# Patient Record
(demographics unavailable — no encounter records)

---

## 2024-11-11 NOTE — ADDENDUM
[FreeTextEntry1] : Please note the patient was reviewed with advanced practice provider I was physically present during the service of the patient I was directly involved in the management plan and recommendations of care provided to the patient.  11/11/2024

## 2024-11-11 NOTE — PROCEDURE
[No] : not [NSR] : normal sinus rhythm [See Device Printout] : See device printout [Pacemaker] : pacemaker [Voltage: ___ volts] : Voltage was [unfilled] volts [Threshold Testing Performed] : Threshold testing was performed [Lead Imp:  ___ohms] : lead impedance was [unfilled] ohms [Sensing Amplitude ___mv] : sensing amplitude was [unfilled] mv [___V @] : [unfilled] V [___ ms] : [unfilled] ms [None] : none [Pace ___ %] : Pace [unfilled]% [de-identified] : Fly  [de-identified] : [id number] [de-identified] : 6/3/24 [de-identified] : ADRIAN [de-identified] :  [de-identified] : > 10 yrs [de-identified] :  No events recorded on Micra.   ILR remains in place and is monitored remotely, therefore unable to monitor Micra remotely.  Reviewed interrogation with MDT rep Ortega who confirmed that ILR is more sensitive for arrhythmia detection therefore was left in place.   Next device interrogation with EP in 6 months.

## 2024-11-11 NOTE — HISTORY OF PRESENT ILLNESS
[FreeTextEntry1] : CLAUDIA FRAGOSO  is a 69 year old  M Last seen in May.  Echocardiogram May 2024 normal left ventricular function aortic root 3.9 cm no significant valvular disease.  Carotid Doppler moderate atherosclerosis.  Borderline aortic dimensions.  Normal left ventricular function.  Carotid atherosclerosis.  Monitor carotid disease.  Monitor aortic dimensions.  Nuclear stress test small fixed defect.  No ischemia normal left ventricular function.  No significant risk current ischemic heart disease.  An outside sleep study has mild sleep apnea prior device check with paroxysmal atrial fibrillation heart block discussed with the EP and recommendation for pacemaker May 2024 potassium 4.6 creatinine 1.4 A1c 7.2 underwent Micra leadless pacemaker June 2024 a postprocedure echocardiogram describes normal left ventricular function with no pericardial effusion follow-up device interrogation follow-up with endocrinology regarding glucose control last office device check was June 2024 follows with hematology regarding phlebotomy there is a history of erythrocytosis.  History of DVT.  Negative hypercoagulable workup.  Remains on anticoagulation.  Also compression stockings remote loop recorder check demonstrates low burden of atrial fibrillation and PVCs blood work August 2024 has a hemoglobin of 15.0 creatinine 1.5 potassium 4.8 creatinine 1.5 September 2024 potassium 4.3 creatinine 1.5 LDL 77 total cholesterol 144 A1c 6.7  Follow-up device check.  Note implantable loop recorder and permanent pacemaker.  Device will be reviewed reviewed for atrial fibrillation.  If documented atrial fibrillation will require adjustment of his anticoagulation dose.  It is notable that he is currently on anticoagulation for his history of DVT.  However this dosing is subtherapeutic for his atrial fibrillation.  Follow-up carotid Doppler.  Follow-up echocardiogram.  Follow-up device check.  Monitor for atrial fibrillation.  There is easy bruising.  He reports his energy initially felt better after pacemaker but subsequently he still does have symptoms of fatigue.  Losartan has been refilled.  CAD s/p coronary bypass surgery on July 16, 2020 for three-vessel disease history of AV block.  s/p ppm, also implantable loop recorder.  atrial fibrillation history of renal transplant on 9/17/2020 on maintenance immunosuppression with Prograf, CellCept and prednisone other history including insulin requiring type 2 diabetes, sleeve gastrectomy in 2015, BENIGNO, hypertension, dyslipidemia, dvt Previously had cardiovascular care at the [facility].   He is maintained on aspirin, anticoagulation, statin and angiotensin receptor blocker.   He is active around his house.   There is chronic dyspnea and fatigue.   His blood pressure remains low.   There is no chest discomfort  He is not having palpitations, orthopnea, or syncope  He does live full-time in [place].  Retired .  Non-smoker.   7/16/2020vfor three-vessel disease with LIMA to LAD SVG to OM and PDA  Bypass surgery was complicated by post op atrial fibrillation and CKD.   AV block with pauses  personally discussed with EP.  I believe he would benefit from pacemaker due to bradycardia in the office with AV block  prior history of advanced AV block and pauses that this may be contributing to his symptoms of fatigue and dyspnea    CAD -s/p coronary bypass surgery done at [facility] on July 16, 2021 with a LIMA to LAD SVG to OM and PDA.  av block, ilr ppm Hypertension Dyslipidemia  Diabetes benigno mild paf S/p renal transplant  h/o DVT  Continue statin as tolerated. Continue antiplatelet therapy.   Continue anticoagulation. Continue angiotensin receptor blocker.    Monitor renal function.   Adjunctive dietary measures.  Glucose control.  Low-sodium diet.

## 2024-11-11 NOTE — HISTORY OF PRESENT ILLNESS
[FreeTextEntry1] : CLAUDIA FRAGOSO  is a 69 year old  M Last seen in May.  Echocardiogram May 2024 normal left ventricular function aortic root 3.9 cm no significant valvular disease.  Carotid Doppler moderate atherosclerosis.  Borderline aortic dimensions.  Normal left ventricular function.  Carotid atherosclerosis.  Monitor carotid disease.  Monitor aortic dimensions.  Nuclear stress test small fixed defect.  No ischemia normal left ventricular function.  No significant risk current ischemic heart disease.  An outside sleep study has mild sleep apnea prior device check with paroxysmal atrial fibrillation heart block discussed with the EP and recommendation for pacemaker May 2024 potassium 4.6 creatinine 1.4 A1c 7.2 underwent Micra leadless pacemaker June 2024 a postprocedure echocardiogram describes normal left ventricular function with no pericardial effusion follow-up device interrogation follow-up with endocrinology regarding glucose control last office device check was June 2024 follows with hematology regarding phlebotomy there is a history of erythrocytosis.  History of DVT.  Negative hypercoagulable workup.  Remains on anticoagulation.  Also compression stockings remote loop recorder check demonstrates low burden of atrial fibrillation and PVCs blood work August 2024 has a hemoglobin of 15.0 creatinine 1.5 potassium 4.8 creatinine 1.5 September 2024 potassium 4.3 creatinine 1.5 LDL 77 total cholesterol 144 A1c 6.7  Follow-up device check.  Note implantable loop recorder and permanent pacemaker.  Device will be reviewed reviewed for atrial fibrillation.  If documented atrial fibrillation will require adjustment of his anticoagulation dose.  It is notable that he is currently on anticoagulation for his history of DVT.  However this dosing is subtherapeutic for his atrial fibrillation.  Follow-up carotid Doppler.  Follow-up echocardiogram.  Follow-up device check.  Monitor for atrial fibrillation.  There is easy bruising.  He reports his energy initially felt better after pacemaker but subsequently he still does have symptoms of fatigue.  Losartan has been refilled.  CAD s/p coronary bypass surgery on July 16, 2020 for three-vessel disease history of AV block.  s/p ppm, also implantable loop recorder.  atrial fibrillation history of renal transplant on 9/17/2020 on maintenance immunosuppression with Prograf, CellCept and prednisone other history including insulin requiring type 2 diabetes, sleeve gastrectomy in 2015, BENIGNO, hypertension, dyslipidemia, dvt Previously had cardiovascular care at the [facility].   He is maintained on aspirin, anticoagulation, statin and angiotensin receptor blocker.   He is active around his house.   There is chronic dyspnea and fatigue.   His blood pressure remains low.   There is no chest discomfort  He is not having palpitations, orthopnea, or syncope  He does live full-time in [place].  Retired .  Non-smoker.   7/16/2020vfor three-vessel disease with LIMA to LAD SVG to OM and PDA  Bypass surgery was complicated by post op atrial fibrillation and CKD.   AV block with pauses  personally discussed with EP.  I believe he would benefit from pacemaker due to bradycardia in the office with AV block  prior history of advanced AV block and pauses that this may be contributing to his symptoms of fatigue and dyspnea    CAD -s/p coronary bypass surgery done at [facility] on July 16, 2021 with a LIMA to LAD SVG to OM and PDA.  av block, ilr ppm Hypertension Dyslipidemia  Diabetes benigno mild paf S/p renal transplant  h/o DVT  Continue statin as tolerated. Continue antiplatelet therapy.   Continue anticoagulation. Continue angiotensin receptor blocker.    Monitor renal function.   Adjunctive dietary measures.  Glucose control.  Low-sodium diet.

## 2024-11-11 NOTE — REVIEW OF SYSTEMS
[Feeling Fatigued] : feeling fatigued [Dyspnea on exertion] : dyspnea during exertion [Joint Pain] : joint pain [Negative] : Heme/Lymph [Easy Bruising] : a tendency for easy bruising

## 2024-11-19 NOTE — HISTORY OF PRESENT ILLNESS
[de-identified] : Referred by: Hospital Follow up \par  \par  Mr. Reeder presented at age 66 in November 2021 for evaluation of bilateral DVT, here for hospital follow up. \par  The patient has a medical history of DM, s/p gastric sleeve, renal transplant July 2020 ([facility]), prior CABG 7/2021 on ASA, c/b DVT/leg clot, known heparin allergy. \par  \par  Presenting HPI: The patient [place] [facility] [place] for LLE cellulitis and initiated on antibiotics for cellulitis, which was c/b bilateral DVT. Doppler US on 11/6/21 showed a non-occlusive DVT in the R common femoral, femoral, popliteal and gastrocnemius veins, as well as DVT involving the left popliteal vein.  X-ray of the left tib/fib showed no evidence of osteomyelitis. He was initiated on Eliquis 10 mg twice daily for 7 days followed by 5 mg twice daily for 3 months minimum.  Laboratory evaluation revealed leukocytosis to 18.41 (89% neutrophils), 6 CRP 12 (elevated), blood cultures no growth to date, urine culture negative.  Leukocytosis improved to 7.8 at the time of admission.  \par  \par  Since discharge he reports low energy levels.  He denies any active bleeding.  He was previously being followed by Dr. Long at [facility] from hematology and had been previously treated with IV iron.  He is also pending phlebotomy today at 1:30 PM for elevated hemoglobin.  JAK2 testing was sent but results are unknown.\par  He remains on Eliquis 5 mg twice daily at this time.  He reports feeling like he is having difficulty healing as well as increased stool frequency.  He also has had occasional episodes of abdominal pain over the past few days but currently denies any active abdominal pain.  He does still have his gallbladder but reports the pain is more midline.\par  Outpatient labs on 10/12/21 were reviewed and discussed with the patient: iron 23 (low), TIBC 273, Tsat 8%, ferritin 21, vitamin B12 855, folate >15\par  \par  HCM: \par  - COVID vaccination: s/p 4 shots, with no antibodies \par  - Colonoscopy: DNA stool test June 2020, negative \par  \par  SH: \par  - Living situation: Lives in [place] \par  - Smoking/etoh/illicits: never smoker, rare etoh \par  \par  FH: \par  - Father with pancreatic cancer age 86 [de-identified] : Cameron presents for follow up on 11/21/24 for DVT and erythrocytosis.  Doppler US on 4/6/22 which showed chronic bilateral lower extremity DVTs without central clot propagation.  Hypercoagulable workup negative.  At today's visit he reports having intermittent fatigue. Remains on eliquis 2.5mg BID. S/p 2 iron infusions in January 2024 which did not help his fatigue.  He saw Dr. Razo today to establish care for GI. Reports chronic abdominal pains. Last colonoscopy 3 years ago.  Having a sleep apnea test next month. He is s/p right hip replacement on 10/6/23- has mostly recovered from the surgery. Does report some right lower extremity swelling and numbness. Reports occasional blood in his stool - has internal hemorrhoids. He denies chills, chest pain, shortness of breath, nausea, vomiting, diarrhea. Using a loop recorder to monitor his heart rhythm.   RUQ US 12/27/23- Multiple small gallbladder stones without ultrasound evidence for acute cholecystitis.  Laboratory studies reviewed at today's visit and notable for: ***

## 2024-12-18 NOTE — HISTORY OF PRESENT ILLNESS
[FreeTextEntry1] : 68-year-old man with history of type 2 diabetes, renal failure status post kidney transplant in 2020 (on prednisone, cellcept and tacrolimus), CAD s/p CABG, A-Fib and AV block (on Eliquis and pacemaker), obesity status post sleeve gasterectomy 2015, hyperlipidemia and recent right hip replacement presents for follow-up.   Plans to travel outside [place] for 2-3 months.   Pt had LLE wound- recently recovered.   Labs  Sep 2024- A1C 6.7%, eGFR 50, Vit B12 of 1299, Tg 122, LDL 77 May 2024- A1C 7.2%, eGFR 55, ACR 9.1 Jan 2024- eGFR 52 (previously ordered labs were not completed)  Currently takes Tresiba 10 U in the morning and Humalog 3-10 U before breakfast (forgets to take it before lunch or dinner), metformin 500 mg BID. Stopped Trulicity 0.75 mg once a week due to cost. No SE on Trulicity. Doesn't want to take Januvia due to concern for potential side effects.    States he has difficulty carrying around and remembering to take Humalog but he couldn't afford Cequr.   CGM downloaded and reviewed: Oscar 2 - doesn't scan oscar frequently; can't upgrade to Oscar 3 for another year due to insurance issues.  Average glucose: 133 % time CGM active:58% % VERY HIGH (>250): 8% % HIGH (181-250): 25% % TARGET (): 67% % LOW (54-69): 0 % VERY LOW (<54): 0   Pt is not scanning oscar frequently. Available data shows a pattern of morning hypoglycemia in 60's and post prandial hyperglycemia. Morning hypoglycemia are asymptomatic. Doesn't confirm it with glucometer. Denies sleeping on the arm the sensor is attached.

## 2024-12-18 NOTE — ASSESSMENT
[FreeTextEntry1] : 68-year-old man with history of type 2 diabetes, renal failure status post kidney transplant, obesity status post sleeve gastric bypass, hyperlipidemia and recent right hip replacement presents for follow-up.   1. Type 2 diabetes-poorly controlled 2. Steroid-induced hyperglycemia (on chronic prednisone 5 mg daily)  Current home regimen- Tresiba 10 units daily, Humalog 3-10 U before breakfast (forgets to take it before lunch or dinner), metformin 500 mg BID. Stopped Trulicity 0.75 mg once a week due price.   Plan: - Emphasized the importance of following recommended regimen. Usually forgets pre-meal insulins.  - He was asked to scan oscar 2 more frequently until he can be switched to Oscar 3 when it's covered  - c/w current regimen - We discussed using pen carrying cooler bag to carry his insulin around and improve his compliance.     3. Hyperlipidemia - Continue with rosuvastatin milligrams daily

## 2025-01-10 NOTE — ASSESSMENT
[FreeTextEntry1] : 70 yo male with BLE edema after bilateral DVTs in 2021.   Pt counseled on above diagnosis. BLE UNNA boot applied  Will refer to tactile for lymphedema massage pump  Pt counseled to walk daily and elevate legs at night RTC in 1 week to monitor UNNA boot   A total of 30 minutes was spent with patient and coordinating care

## 2025-01-10 NOTE — HISTORY OF PRESENT ILLNESS
[FreeTextEntry1] : 12/23/24: 70 yo male PMHx HTN, carotid stenosis, CAD, hx of DVT, HLD, DM II, presents for evaluation of BLE lymphedema. Pt had bilateral DVT in November 2021. On duplex pt had non-occlusive DVT in the R common femoral, femoral, popliteal and gastrocnemius veins, as well as DVT involving the left popliteal vein. He has discharged on Eliquis 5 mg BID and remains on Eliquis.   1/2/25: Pt had BLE UNNA boots on for the past week. Has some mild improvement in edema.

## 2025-01-23 NOTE — PHYSICAL EXAM
[Ankle Swelling (On Exam)] : present [Ankle Swelling Bilaterally] : severe [de-identified] : NAD. well appearing

## 2025-01-23 NOTE — PROCEDURE
[FreeTextEntry1] : 1/23/25 BLE venous duplex:  RT- deep insufficiency CFV, pop. LT- non occlusive post thrombotic in pop with reflux  LT- incompetent perf posterior calf and VV in calf proximal to distal. No DVT

## 2025-01-23 NOTE — HISTORY OF PRESENT ILLNESS
[FreeTextEntry1] : 12/23/24: 70 yo male PMHx HTN, carotid stenosis, CAD, hx of DVT, HLD, DM II, presents for evaluation of BLE lymphedema. Pt had bilateral DVT in November 2021. On duplex pt had non-occlusive DVT in the R common femoral, femoral, popliteal and gastrocnemius veins, as well as DVT involving the left popliteal vein. He has discharged on Eliquis 5 mg BID and remains on Eliquis.   1/2/25: Pt had BLE UNNA boots on for the past week. Has some mild improvement in edema.   1/23/25: Pt has BLE edema. He is compliant with Eliquis 5 mg BID.

## 2025-01-23 NOTE — ASSESSMENT
[FreeTextEntry1] : 70 yo male with BLE edema after bilateral DVTs in 2021.   Pt counseled on above diagnosis. BLE UNNA boot applied  Pt has been referred to tactile for lymphedema massage pump  Pt counseled to walk daily and elevate legs at night RTC in 1 week to monitor UNNA boot   A total of 30 minutes was spent with patient and coordinating care

## 2025-01-24 NOTE — RESULTS/DATA
[FreeTextEntry1] : Mr. Reeder initially presented at age 66 in November 2021 for evaluation of bilateral DVT, here for hospital follow up.  The patient has a medical history of DM, s/p gastric sleeve, renal transplant July 2020 ([facility]), prior CABG on ASA, c/b DVT/leg clot, known heparin allergy.   Continue eliquis 2.5mg BID for b/l DVT. Doppler US with chronic thrombosis, no central clot propagation.  Repeat US 4/2023- no new DVT, +RLE seroma  No indication for phlebotomy at this time.  Hypercoagulable workup negative.  Fatigue- workup negative, stable overall No evidence of anemia on recent labs This is likely multifactorial 2/2 poor sleep, myelosuppressive medications. Sleep work up ongoing. Following w/ Dr. Razo for intermittent longstanding epigastric discomfort RUQ US revealed gallstones, no evidence of cholecystitis.  Now proceeding w/ phlebotomy (500 ml) q 4 weeks as per [place] renal transplant team for hgb >17 and/or hct >50. - last phlebotomy performed 7/18/24 (will call as needed for scripts to repeat labs) Next routine visit w/ Dr. Bran in 5/2025, sooner prn  All patient questions answered at today's visit. Patient urged to call the office with any questions or concerns.   I personally have spent a total of 35 minutes of time on the date of this encounter reviewing test results, documenting findings, coordinating care and directly consulting with the patient and/or designated family member. Greater than 50% of the face to face encounter time was spent on counseling and/or coordination of care for recurrent DVT, fatigue, abdominal pain.

## 2025-01-24 NOTE — HISTORY OF PRESENT ILLNESS
[de-identified] : Referred by: Hospital Follow up \par  \par  Mr. Reeder presented at age 66 in November 2021 for evaluation of bilateral DVT, here for hospital follow up. \par  The patient has a medical history of DM, s/p gastric sleeve, renal transplant July 2020 ([facility]), prior CABG 7/2021 on ASA, c/b DVT/leg clot, known heparin allergy. \par  \par  Presenting HPI: The patient was recently admitted to [facility] [place] 11/5[place]21 to 11/10/21 for LLE cellulitis and initiated on antibiotics for cellulitis, which was c/b bilateral DVT. Doppler US on 11/6/21 showed a non-occlusive DVT in the R common femoral, femoral, popliteal and gastrocnemius veins, as well as DVT involving the left popliteal vein.  X-ray of the left tib/fib showed no evidence of osteomyelitis. He was initiated on Eliquis 10 mg twice daily for 7 days followed by 5 mg twice daily for 3 months minimum.  Laboratory evaluation revealed leukocytosis to 18.41 (89% neutrophils), 6 CRP 12 (elevated), blood cultures no growth to date, urine culture negative.  Leukocytosis improved to 7.8 at the time of admission.  \par  \par  Since discharge he reports low energy levels.  He denies any active bleeding.  He was previously being followed by Dr. Long at [place] from hematology and had been previously treated with IV iron.  He is also pending phlebotomy today at 1:30 PM for elevated hemoglobin.  JAK2 testing was sent but results are unknown.\par  He remains on Eliquis 5 mg twice daily at this time.  He reports feeling like he is having difficulty healing as well as increased stool frequency.  He also has had occasional episodes of abdominal pain over the past few days but currently denies any active abdominal pain.  He does still have his gallbladder but reports the pain is more midline.\par  Outpatient labs on 10/12/21 were reviewed and discussed with the patient: iron 23 (low), TIBC 273, Tsat 8%, ferritin 21, vitamin B12 855, folate >15\par  \par  HCM: \par  - COVID vaccination: s/p 4 shots, with no antibodies \par  - Colonoscopy: DNA stool test June 2020, negative \par  \par  SH: \par  - Living situation: Lives in [place] \par  - Smoking/etoh/illicits: never smoker, rare etoh \par  \par  FH: \par  - Father with pancreatic cancer age 86 [de-identified] : Cameron presents for follow up on 1/23/25 for DVT and erythrocytosis.  Doppler US on 4/6/22 which showed chronic bilateral lower extremity DVTs without central clot propagation.  Hypercoagulable workup negative.  At today's visit Cameron reports feeling generally well.  He continues eliquis 2.5mg BID without bleeding or bruising issues. S/p 2 iron infusions in January 2024 with good tolerance.   Admitted to [facility] for L LE cellulitis 2 months ago (continues to follow w/ Dr. Killian for lymphedema).  He saw Dr. Razo to establish care; chronic abdominal pain not exacerbated.  He is s/p right hip replacement on 10/6/23- has mostly recovered from the surgery.  Last phlebotomy 7/18/24 (as per renal transplant team at [facility]) - his CBC results remain within parameters since that time.  He is leaving next week for [facility] (not planning to return for ~ 3 months). He denies chills, chest pain, shortness of breath, nausea, vomiting, diarrhea. Using a loop recorder to monitor his heart rhythm.   RUQ US 12/27/23- Multiple small gallbladder stones without ultrasound evidence for acute cholecystitis.  Laboratory studies reviewed from 1/9/25 lab draw and notable for:  WBC 8.1, hgb 13.8, hct 44.7, plt 296 Creat 1.41, AST/ALT wnl

## 2025-01-24 NOTE — HISTORY OF PRESENT ILLNESS
[de-identified] : Referred by: Hospital Follow up \par  \par  Mr. Reeder presented at age 66 in November 2021 for evaluation of bilateral DVT, here for hospital follow up. \par  The patient has a medical history of DM, s/p gastric sleeve, renal transplant July 2020 ([facility]), prior CABG 7/2021 on ASA, c/b DVT/leg clot, known heparin allergy. \par  \par  Presenting HPI: The patient was recently admitted to [facility] from 11/5/21 to 11/10/21 for LLE cellulitis and initiated on antibiotics for cellulitis, which was c/b bilateral DVT. Doppler US on 11/6/21 showed a non-occlusive DVT in the R common femoral, femoral, popliteal and gastrocnemius veins, as well as DVT involving the left popliteal vein.  X-ray of the left tib/fib showed no evidence of osteomyelitis. He was initiated on Eliquis 10 mg twice daily for 7 days followed by 5 mg twice daily for 3 months minimum.  Laboratory evaluation revealed leukocytosis to 18.41 (89% neutrophils), 6 CRP 12 (elevated), blood cultures no growth to date, urine culture negative.  Leukocytosis improved to 7.8 at the time of admission.  \par  \par  Since discharge he reports low energy levels.  He denies any active bleeding.  He was previously being followed by Dr. Long at [place] from hematology and had been previously treated with IV iron.  He is also pending phlebotomy today at 1:30 PM for elevated hemoglobin.  JAK2 testing was sent but results are unknown.\par  He remains on Eliquis 5 mg twice daily at this time.  He reports feeling like he is having difficulty healing as well as increased stool frequency.  He also has had occasional episodes of abdominal pain over the past few days but currently denies any active abdominal pain.  He does still have his gallbladder but reports the pain is more midline.\par  Outpatient labs on 10/12/21 were reviewed and discussed with the patient: iron 23 (low), TIBC 273, Tsat 8%, ferritin 21, vitamin B12 855, folate >15\par  \par  HCM: \par  - COVID vaccination: s/p 4 shots, with no antibodies \par  - Colonoscopy: DNA stool test June 2020, negative \par  \par  SH: \par  - Living situation: Lives in [place] \par  - Smoking/etoh/illicits: never smoker, rare etoh \par  \par  FH: \par  - Father with pancreatic cancer age 86 [de-identified] : Cameron presents for follow up on 1/23/25 for DVT and erythrocytosis.  Doppler US on 4/6/22 which showed chronic bilateral lower extremity DVTs without central clot propagation.  Hypercoagulable workup negative.  At today's visit Cameron reports feeling generally well.  He continues eliquis 2.5mg BID without bleeding or bruising issues. S/p 2 iron infusions in January 2024 with good tolerance.   Admitted to [facility] for L LE cellulitis 2 months ago (continues to follow w/ Dr. Killian for lymphedema).  He saw Dr. Razo to establish care; chronic abdominal pain not exacerbated.  He is s/p right hip replacement on 10/6/23- has mostly recovered from the surgery.  Last phlebotomy 7/18/24 (as per renal transplant team at [facility]) - his CBC results remain within parameters since that time.  He is leaving next week for [place] in [place] (not planning to return for ~ 3 months). He denies chills, chest pain, shortness of breath, nausea, vomiting, diarrhea. Using a loop recorder to monitor his heart rhythm.   RUQ US 12/27/23- Multiple small gallbladder stones without ultrasound evidence for acute cholecystitis.  Laboratory studies reviewed from 1/9/25 lab draw and notable for:  WBC 8.1, hgb 13.8, hct 44.7, plt 296 Creat 1.41, AST/ALT wnl

## 2025-01-24 NOTE — RESULTS/DATA
[FreeTextEntry1] : Mr. Reeder initially presented at age 66 in November 2021 for evaluation of bilateral DVT, here for hospital follow up.  The patient has a medical history of DM, s/p gastric sleeve, renal transplant July 2020 ([facility]), prior CABG on ASA, c/b DVT/leg clot, known heparin allergy.   Continue eliquis 2.5mg BID for b/l DVT. Doppler US with chronic thrombosis, no central clot propagation.  Repeat US 4/2023- no new DVT, +RLE seroma  No indication for phlebotomy at this time.  Hypercoagulable workup negative.  Fatigue- workup negative, stable overall No evidence of anemia on recent labs This is likely multifactorial 2/2 poor sleep, myelosuppressive medications. Sleep work up ongoing. Following w/ Dr. Razo for intermittent longstanding epigastric discomfort RUQ US revealed gallstones, no evidence of cholecystitis.  Now proceeding w/ phlebotomy (500 ml) q 4 weeks as per [place] renal transplant team for hgb >17 and/or hct >50. - last phlebotomy performed 7/18/24 (will call as needed for scripts to repeat labs) Next routine visit w/ Dr. Bran in 5/2025, sooner prn  All patient questions answered at today's visit. Patient urged to call the office with any questions or concerns.   I personally have spent a total of 35 minutes of time on the date of this encounter reviewing test results, documenting findings, coordinating care and directly consulting with the patient and/or designated family member. Greater than 50% of the face to face encounter time was spent on counseling and/or coordination of care for recurrent DVT, fatigue, abdominal pain.

## 2025-01-24 NOTE — PHYSICAL EXAM
[Normal] : affect appropriate [de-identified] : Generally well appearing male, NAD [de-identified] : No significant LE swelling appreciable (wearing L LE Shavonne boot) [de-identified] :  Expected bruising of upper arms noted

## 2025-01-24 NOTE — PHYSICAL EXAM
[Normal] : affect appropriate [de-identified] : Generally well appearing male, NAD [de-identified] : No significant LE swelling appreciable (wearing L LE Shavonne boot) [de-identified] :  Expected bruising of upper arms noted

## 2025-01-27 NOTE — HISTORY OF PRESENT ILLNESS
[FreeTextEntry1] : CLAUDIA FRAGOSO  is a 69 year old  M He is heading to [place] for several months he has had follow-up with the [facility] his energy has improved since placement of the pacemaker there are no bleeding issues on combined antiplatelet and anticoagulation therapy there has been improvement in his peripheral edema since recent vascular treatment his blood pressure has been labile on repeat exam it was within normal limits there has been prior intolerance to statin and possible Zetia there is chronic peripheral edema which limits calcium channel blocker.  Also would not want to add thiazide with chronic kidney disease and prior transplant.  Blood pressure does remain within normal limits but there is left ventricular hypertrophy on his echo a clinical follow-up with contrast echo will be performed it is notable that this prior echocardiogram was technically limited I discussed possible increasing his losartan.  However would need to obtain clearance from his renal transplant team at [place] monitor for hypertensive heart disease.  Monitor home blood pressure.  Monitor left ventricular function.  Monitor outflow tract gradient.  Monitor aortic dimensions.  Monitor pulmonary pressures.  I reviewed long-term LDL goal and threshold.  Follow-up blood work has been requested.  I do not want to start before his international travel.  He reports he can make improvement lifestyle measures.  We also discussed nonstatin lipid-lowering therapies.  He is familiar with this as his wife takes PCSK9 monoclonal antibody.  Also he does use insulin for long-term treatment of his diabetes.  Clinical follow-up will be scheduled after requested blood work.  Last seen November 2024 at that time noninvasive testing was recommended carotid Doppler moderate disease echocardiogram normal left ventricular function left ventricular hypertrophy mild valvular heart disease mild pulmonary hypertension.  History of DVT.  Follows with hematology.  On anticoagulation.  Labs December 2024 hemoglobin 13.7 creatinine 1.4 device check reviewed PVC burden 2.6% follow-up with endocrinology regarding diabetes management interim establish care with vascular lower extremity edema.  Unna boot.  Lymphedema treatment.  Improvement in edema.  Keep legs elevated.  Blood work January 2025 hemoglobin 13.8 creatinine 1.4 total cholesterol 198  TSH within normal limits last device check sinus rhythm PVC burden 3% no atrial fibrillation last visit with vascular and hematology noted ultrasound demonstrates no DVT but venous insufficiency post thrombotic changes hypercoagulable workup reportedly negative there is plan for indefinite anticoagulation given history of multiple clots and ? af  Current cardiovascular regimen includes losartan rosuvastatin and aspirin.   Follow-up loop recorder check remote.  Follow-up in office microtech check.  Next in office device check will be May 2025  CAD s/p coronary bypass surgery on July 16, 2020 for three-vessel disease history of AV block. s/p ppm, also implantable loop recorder. history of renal transplant on 9/17/2020 on maintenance immunosuppression with Prograf, CellCept and prednisone other history including insulin requiring type 2 diabetes, sleeve gastrectomy in 2015, BENIGNO, hypertension, dyslipidemia, dvt there is a history of erythrocytosis. History of DVT. Negative hypercoagulable workup. Remains on anticoagulation Previously had cardiovascular care at the [facility].  prior device check with heart block  discussed with EP and recommendation for pacemaker underwent Micra leadless pacemaker June 2024   Nuclear stress test small fixed defect. No ischemia normal left ventricular function.  He does live full-time in [place]. [facility]. Non-smoker.  7/16/2020vfor three-vessel disease with LIMA to LAD SVG to OM and PDA Bypass surgery was complicated by post op atrial fibrillation and CKD.  No significant recurrent ischemic heart disease.  Normal left ventricular function.  Carotid atherosclerosis.  Monitor carotid disease.   Follow-up carotid Doppler.  Monitor aortic dimensions.  Follow-up echocardiogram. follow-up device interrogation  If atrial fibrillation will require adjustment of his anticoagulation dose. t is notable that he is currently on anticoagulation for his history of DVT.   CAD -s/p coronary bypass surgery done at [facility] on July 16, 2021 with a LIMA to LAD SVG to OM and PDA. AV block with pauses s/p ilr & ppm Hypertension Dyslipidemia Diabetes benigno mild paf? S/p renal transplant h/o DVT  Continue statin as tolerated. Continue antiplatelet therapy. Continue anticoagulation. Continue angiotensin receptor blocker. Monitor renal function. Adjunctive dietary measures. Glucose control. Low-sodium diet.

## 2025-01-27 NOTE — HISTORY OF PRESENT ILLNESS
[FreeTextEntry1] : CLAUDIA FRAGOSO  is a 69 year old  M He is heading to [place] for several months he has had follow-up with the [facility] his energy has improved since placement of the pacemaker there are no bleeding issues on combined antiplatelet and anticoagulation therapy there has been improvement in his peripheral edema since recent vascular treatment his blood pressure has been labile on repeat exam it was within normal limits there has been prior intolerance to statin and possible Zetia there is chronic peripheral edema which limits calcium channel blocker.  Also would not want to add thiazide with chronic kidney disease and prior transplant.  Blood pressure does remain within normal limits but there is left ventricular hypertrophy on his echo a clinical follow-up with contrast echo will be performed it is notable that this prior echocardiogram was technically limited I discussed possible increasing his losartan.  However would need to obtain clearance from his renal transplant team at [place] monitor for hypertensive heart disease.  Monitor home blood pressure.  Monitor left ventricular function.  Monitor outflow tract gradient.  Monitor aortic dimensions.  Monitor pulmonary pressures.  I reviewed long-term LDL goal and threshold.  Follow-up blood work has been requested.  I do not want to start before his international travel.  He reports he can make improvement lifestyle measures.  We also discussed nonstatin lipid-lowering therapies.  He is familiar with this as his wife takes PCSK9 monoclonal antibody.  Also he does use insulin for long-term treatment of his diabetes.  Clinical follow-up will be scheduled after requested blood work.  Last seen November 2024 at that time noninvasive testing was recommended carotid Doppler moderate disease echocardiogram normal left ventricular function left ventricular hypertrophy mild valvular heart disease mild pulmonary hypertension.  History of DVT.  Follows with hematology.  On anticoagulation.  Labs December 2024 hemoglobin 13.7 creatinine 1.4 device check reviewed PVC burden 2.6% follow-up with endocrinology regarding diabetes management interim establish care with vascular lower extremity edema.  Unna boot.  Lymphedema treatment.  Improvement in edema.  Keep legs elevated.  Blood work January 2025 hemoglobin 13.8 creatinine 1.4 total cholesterol 198  TSH within normal limits last device check sinus rhythm PVC burden 3% no atrial fibrillation last visit with vascular and hematology noted ultrasound demonstrates no DVT but venous insufficiency post thrombotic changes hypercoagulable workup reportedly negative there is plan for indefinite anticoagulation given history of multiple clots and ? af  Current cardiovascular regimen includes losartan rosuvastatin and aspirin.   Follow-up loop recorder check remote.  Follow-up in office microtech check.  Next in office device check will be May 2025  CAD s/p coronary bypass surgery on July 16, 2020 for three-vessel disease history of AV block. s/p ppm, also implantable loop recorder. history of renal transplant on 9/17/2020 on maintenance immunosuppression with Prograf, CellCept and prednisone other history including insulin requiring type 2 diabetes, sleeve gastrectomy in 2015, BENIGNO, hypertension, dyslipidemia, dvt there is a history of erythrocytosis. History of DVT. Negative hypercoagulable workup. Remains on anticoagulation Previously had cardiovascular care at the [facility].  prior device check with heart block  discussed with EP and recommendation for pacemaker underwent Micra leadless pacemaker June 2024   Nuclear stress test small fixed defect. No ischemia normal left ventricular function.  He does live full-time in [place]. [facility]. Non-smoker.  7/16/2020vfor three-vessel disease with LIMA to LAD SVG to OM and PDA Bypass surgery was complicated by post op atrial fibrillation and CKD.  No significant recurrent ischemic heart disease.  Normal left ventricular function.  Carotid atherosclerosis.  Monitor carotid disease.   Follow-up carotid Doppler.  Monitor aortic dimensions.  Follow-up echocardiogram. follow-up device interrogation  If atrial fibrillation will require adjustment of his anticoagulation dose. t is notable that he is currently on anticoagulation for his history of DVT.   CAD -s/p coronary bypass surgery done at [facility] on July 16, 2021 with a LIMA to LAD SVG to OM and PDA. AV block with pauses s/p ilr & ppm Hypertension Dyslipidemia Diabetes benigno mild paf? S/p renal transplant h/o DVT  Continue statin as tolerated. Continue antiplatelet therapy. Continue anticoagulation. Continue angiotensin receptor blocker. Monitor renal function. Adjunctive dietary measures. Glucose control. Low-sodium diet.

## 2025-01-30 NOTE — PHYSICAL EXAM
[Ankle Swelling (On Exam)] : present [Ankle Swelling Bilaterally] : severe [de-identified] : NAD. well appearing

## 2025-01-30 NOTE — HISTORY OF PRESENT ILLNESS
[FreeTextEntry1] : 12/23/24: 70 yo male PMHx HTN, carotid stenosis, CAD, hx of DVT, HLD, DM II, presents for evaluation of BLE lymphedema. Pt had bilateral DVT in November 2021. On duplex pt had non-occlusive DVT in the R common femoral, femoral, popliteal and gastrocnemius veins, as well as DVT involving the left popliteal vein. He has discharged on Eliquis 5 mg BID and remains on Eliquis.   1/2/25: Pt had BLE UNNA boots on for the past week. Has some mild improvement in edema.   1/23/25: Pt has BLE edema. He is compliant with Eliquis 5 mg BID.   1/30/25: Pt has increased LLE edema. He denies any chest pain or SOB.

## 2025-01-30 NOTE — ASSESSMENT
[FreeTextEntry1] : 68 yo male with BLE edema after bilateral DVTs in 2021.   Pt counseled on above diagnosis. GENEE SHAILA toriboi applied  Pt has been referred to tactile for lymphedema massage pump  Pt counseled to walk daily and elevate legs at night RTC in 1 week to monitor UNNA boot   A total of 30 minutes was spent with patient and coordinating care DC instructions Impaired Gait

## 2025-04-24 NOTE — ASSESSMENT
[FreeTextEntry1] : 70 yo male with non-healing LLE wounds.   Pt counseled on above diagnosis. Pt refused UNNA boots. Wound like to do his own wound care  Pt has been referred to tactile for lymphedema massage pump  Pt counseled to walk daily and elevate legs at night RTC PRN  A total of 30 minutes was spent with patient and coordinating care

## 2025-04-24 NOTE — HISTORY OF PRESENT ILLNESS
[FreeTextEntry1] : 12/23/24: 68 yo male PMHx HTN, carotid stenosis, CAD, hx of DVT, HLD, DM II, presents for evaluation of BLE lymphedema. Pt had bilateral DVT in November 2021. On duplex pt had non-occlusive DVT in the R common femoral, femoral, popliteal and gastrocnemius veins, as well as DVT involving the left popliteal vein. He has discharged on Eliquis 5 mg BID and remains on Eliquis.   1/2/25: Pt had BLE UNNA boots on for the past week. Has some mild improvement in edema.   1/23/25: Pt has BLE edema. He is compliant with Eliquis 5 mg BID.   1/30/25: Pt has increased LLE edema. He denies any chest pain or SOB.   4/24/25: Pt has developed several wounds on LLE. He is treating them with alcohol and ointment. He denies any fever or chills.

## 2025-04-24 NOTE — PHYSICAL EXAM
[Ankle Swelling (On Exam)] : present [Ankle Swelling Bilaterally] : severe [de-identified] : NAD. well appearing  [FreeTextEntry1] : Five wounds on LLE. Two wounds on anterior shin. One wounds on Medial calf. Dorsal foot. All wounds about 0.5 cm x 0.5 cm. Wound base with slough.

## 2025-05-01 NOTE — REVIEW OF SYSTEMS
[Feeling Fatigued] : feeling fatigued [Lower Ext Edema] : lower extremity edema [Joint Pain] : joint pain [Easy Bruising] : a tendency for easy bruising [Negative] : Psychiatric

## 2025-05-02 NOTE — HISTORY OF PRESENT ILLNESS
[FreeTextEntry1] : CLAUDIA FRAGOSO  is a 69 year old  M  In the interim there has been visits with vascular for management of his lower extremity edema loop recorder February 2025 no A-fib 3% burden of PVCs March 2025 no atrial fibrillation 3.2% burden of PVCs outside blood work April 2025 A1c 8.4 follows with endocrinology regarding diabetes management hemoglobin 14.0 creatinine 1.4  CRP 0.2 CPK 47  TSH 0.8 LPA within normal limits recent labs reviewed blood pressure has been controlled.  His lower extremity edema is better.  He is also following with wound care.  There are no bleeding issues.  Discussed importance of glucose control.  Reviewed long-term LDL goal.  Favor addition of monoclonal antibody.  He reports he can make improvement lifestyle measures.  If cholesterol remains elevated further adjunctive lipid-lowering therapy.  Follow-up device check.  CAD s/p coronary bypass surgery on July 16, 2020 for three-vessel disease history of AV block. s/p ppm, also implantable loop recorder?? history of renal transplant on 9/17/2020 on maintenance immunosuppression with Prograf, CellCept and prednisone other history including insulin requiring type 2 diabetes, sleeve gastrectomy in 2015, BENIGNO, hypertension, dyslipidemia, dvt there is a history of erythrocytosis. History of DVT. Negative hypercoagulable workup. Remains on indef anticoagulation Previously had cardiovascular care at the [facility].  prior device check with heart block Micra leadless pacemaker June 2024  He does live full-time in [place]. . Non-smoker.  7/16/2020vfor three-vessel disease with LIMA to LAD SVG to OM and PDA Bypass surgery was complicated by post op atrial fibrillation and CKD.  Last seen November 2024  at that time noninvasive testing was recommended  carotid Doppler moderate disease  echocardiogram normal left ventricular function left ventricular hypertrophy mild valvular heart disease mild pulmonary hypertension.  Nuclear stress test small fixed defect. No ischemia normal left ventricular function.  He is heading to [place] for several months  had follow-up with the [facility]  energy has improved since placement of the pacemaker  there are no bleeding issues on combined antiplatelet and anticoagulation therapy vascular for lower extremity edema.  blood pressure has been labile  on repeat exam it was within normal limits  prior intolerance to statin and possible Zetia   follow-up with endocrinology regarding diabetes management  Improvement in edema. f/u vasc Keep legs elevated.  chronic peripheral edema w limits calcium channel blocker at clinical follow-up contrast echo will be performed it is notable that this prior echocardiogram was technically limited  Monitor home blood pressure.  Monitor left ventricular function, outflow tract gradient, aortic dimensions pulmonary pressures.  Reviewed long-term LDL goal and threshold.  Discussed nonstatin lipid-lowering therapies. He is familiar with this as his wife takes PCSK9 monoclonal antibody.  Also he does use insulin for long-term treatment of his diabetes.  Clinical follow-up will be scheduled after requested blood work.  Monitor carotid disease. follow-up device interrogation If atrial fibrillation will require adjustment of his anticoagulation dose. t is notable that he is currently on anticoagulation for his history of DVT.  CAD -s/p coronary bypass surgery done at [facility] on July 16, 2021 with a LIMA to LAD SVG to OM and PDA. AV block with pauses s/p ilr & ppm Hypertension Dyslipidemia Diabetes benigno mild paf? S/p renal transplant h/o DVT  Continue statin as tolerated. Continue antiplatelet therapy. Continue anticoagulation. Continue angiotensin receptor blocker. Monitor renal function. Adjunctive dietary measures. Glucose control. Low-sodium diet.

## 2025-05-02 NOTE — HISTORY OF PRESENT ILLNESS
[FreeTextEntry1] : CLAUDIA FRAGOSO  is a 69 year old  M  In the interim there has been visits with vascular for management of his lower extremity edema loop recorder February 2025 no A-fib 3% burden of PVCs March 2025 no atrial fibrillation 3.2% burden of PVCs outside blood work April 2025 A1c 8.4 follows with endocrinology regarding diabetes management hemoglobin 14.0 creatinine 1.4  CRP 0.2 CPK 47  TSH 0.8 LPA within normal limits recent labs reviewed blood pressure has been controlled.  His lower extremity edema is better.  He is also following with wound care.  There are no bleeding issues.  Discussed importance of glucose control.  Reviewed long-term LDL goal.  Favor addition of monoclonal antibody.  He reports he can make improvement lifestyle measures.  If cholesterol remains elevated further adjunctive lipid-lowering therapy.  Follow-up device check.  CAD s/p coronary bypass surgery on July 16, 2020 for three-vessel disease history of AV block. s/p ppm, also implantable loop recorder?? history of renal transplant on 9/17/2020 on maintenance immunosuppression with Prograf, CellCept and prednisone other history including insulin requiring type 2 diabetes, sleeve gastrectomy in 2015, BENIGNO, hypertension, dyslipidemia, dvt there is a history of erythrocytosis. History of DVT. Negative hypercoagulable workup. Remains on indef anticoagulation Previously had cardiovascular care at the [facility].  prior device check with heart block Micra leadless pacemaker June 2024  He does live full-time in [place]. . Non-smoker.  7/16/2020vfor three-vessel disease with LIMA to LAD SVG to OM and PDA Bypass surgery was complicated by post op atrial fibrillation and CKD.  Last seen November 2024  at that time noninvasive testing was recommended  carotid Doppler moderate disease  echocardiogram normal left ventricular function left ventricular hypertrophy mild valvular heart disease mild pulmonary hypertension.  Nuclear stress test small fixed defect. No ischemia normal left ventricular function.  He is heading to [place] for several months  had follow-up with the [facility]  energy has improved since placement of the pacemaker  there are no bleeding issues on combined antiplatelet and anticoagulation therapy vascular for lower extremity edema.  blood pressure has been labile  on repeat exam it was within normal limits  prior intolerance to statin and possible Zetia   follow-up with endocrinology regarding diabetes management  Improvement in edema. f/u vasc Keep legs elevated.  chronic peripheral edema w limits calcium channel blocker at clinical follow-up contrast echo will be performed it is notable that this prior echocardiogram was technically limited  Monitor home blood pressure.  Monitor left ventricular function, outflow tract gradient, aortic dimensions pulmonary pressures.  Reviewed long-term LDL goal and threshold.  Discussed nonstatin lipid-lowering therapies. He is familiar with this as his wife takes PCSK9 monoclonal antibody.  Also he does use insulin for long-term treatment of his diabetes.  Clinical follow-up will be scheduled after requested blood work.  Monitor carotid disease. follow-up device interrogation If atrial fibrillation will require adjustment of his anticoagulation dose. t is notable that he is currently on anticoagulation for his history of DVT.  CAD -s/p coronary bypass surgery done at [facility] on July 16, 2021 with a LIMA to LAD SVG to OM and PDA. AV block with pauses s/p ilr & ppm Hypertension Dyslipidemia Diabetes benigno mild paf? S/p renal transplant h/o DVT  Continue statin as tolerated. Continue antiplatelet therapy. Continue anticoagulation. Continue angiotensin receptor blocker. Monitor renal function. Adjunctive dietary measures. Glucose control. Low-sodium diet.

## 2025-05-12 NOTE — PHYSICAL EXAM
[Normal] : affect appropriate [de-identified] : Generally well appearing male, NAD [de-identified] : No significant LE swelling appreciable (wearing L LE Shavonne boot) [de-identified] :  Expected bruising of upper arms noted

## 2025-05-12 NOTE — RESULTS/DATA
[FreeTextEntry1] : Mr. Reeder initially presented at age 66 in November 2021 for evaluation of bilateral DVT, here for hospital follow up.  The patient has a medical history of DM, s/p gastric sleeve, renal transplant July 2020 ([facility]), prior CABG on ASA, c/b DVT/leg clot, known heparin allergy.   Continue eliquis 2.5mg BID for b/l DVT. Doppler US with chronic thrombosis, no central clot propagation.  Repeat US 4/2023- no new DVT, +RLE seroma  No indication for phlebotomy at this time.  Hypercoagulable workup negative.  Fatigue- workup negative, stable overall No evidence of anemia recently (will repeat anemia labs 7/2025) This is likely multifactorial 2/2 poor sleep, myelosuppressive medications.  Saw w/ Dr. Razo for intermittent longstanding epigastric discomfort RUQ US revealed gallstones, no evidence of cholecystitis.  Now proceeding w/ phlebotomy (500 ml) q 4 weeks prn as per [place] renal transplant team for hgb >17 and/or hct >50. - last phlebotomy performed 7/18/24 (now on labs q3 months, given paper scripts for 7/2025) Next routine visit 8/2025 w/ WALLACE Maurer NP sooner prn (will be seen at [place] in 9/2025, then subsequent visit w/ Dr. Bran in 12/2025)  All patient questions answered at today's visit. Patient urged to call the office with any questions or concerns.   I personally have spent a total of 35 minutes of time on the date of this encounter reviewing test results, documenting findings, coordinating care and directly consulting with the patient and/or designated family member. Greater than 50% of the face to face encounter time was spent on counseling and/or coordination of care for recurrent DVT, fatigue, abdominal pain.

## 2025-05-12 NOTE — RESULTS/DATA
[FreeTextEntry1] : Mr. Reeder initially presented at age 66 in November 2021 for evaluation of bilateral DVT, here for hospital follow up.  The patient has a medical history of DM, s/p gastric sleeve, renal transplant July 2020 ([facility]), prior CABG on ASA, c/b DVT/leg clot, known heparin allergy.   Continue eliquis 2.5mg BID for b/l DVT. Doppler US with chronic thrombosis, no central clot propagation.  Repeat US 4/2023- no new DVT, +RLE seroma  No indication for phlebotomy at this time.  Hypercoagulable workup negative.  Fatigue- workup negative, stable overall No evidence of anemia recently (will repeat anemia labs 7/2025) This is likely multifactorial 2/2 poor sleep, myelosuppressive medications.  Saw w/ Dr. Razo for intermittent longstanding epigastric discomfort RUQ US revealed gallstones, no evidence of cholecystitis.  Now proceeding w/ phlebotomy (500 ml) q 4 weeks prn as per [place] renal transplant team for hgb >17 and/or hct >50. - last phlebotomy performed 7/18/24 (now on labs q3 months, given paper scripts for 7/2025) Next routine visit 8/2025 w/ WALLACE Maurer NP sooner prn (will be seen at [place] in 9/2025, then subsequent visit w/ Dr. Bran in 12/2025)  All patient questions answered at today's visit. Patient urged to call the office with any questions or concerns.   I personally have spent a total of 35 minutes of time on the date of this encounter reviewing test results, documenting findings, coordinating care and directly consulting with the patient and/or designated family member. Greater than 50% of the face to face encounter time was spent on counseling and/or coordination of care for recurrent DVT, fatigue, abdominal pain.

## 2025-05-12 NOTE — HISTORY OF PRESENT ILLNESS
[de-identified] : Referred by: Hospital Follow up   Mr. Reeder presented at age 66 in November 2021 for evaluation of bilateral DVT, here for hospital follow up.  The patient has a medical history of DM, s/p gastric sleeve, renal transplant July 2020 ([facility]), prior CABG 7/2021 on ASA, c/b DVT/leg clot, known heparin allergy.   Presenting HPI: The patient was recently admitted to [facility] from [place]5/21 to 11/10/21 for LLE cellulitis and initiated on antibiotics for cellulitis, which was c/b bilateral DVT. Doppler US on 11/6/21 showed a non-occlusive DVT in the R common femoral, femoral, popliteal and gastrocnemius veins, as well as DVT involving the left popliteal vein.  X-ray of the left tib/fib showed no evidence of osteomyelitis. He was initiated on Eliquis 10 mg twice daily for 7 days followed by 5 mg twice daily for 3 months minimum.  Laboratory evaluation revealed leukocytosis to 18.41 (89% neutrophils), 6 CRP 12 (elevated), blood cultures no growth to date, urine culture negative.  Leukocytosis improved to 7.8 at the time of admission.    Since discharge he reports low energy levels.  He denies any active bleeding.  He was previously being followed by Dr. Long at [place] from hematology and had been previously treated with IV iron.  He is also pending phlebotomy today at 1:30 PM for elevated hemoglobin.  JAK2 testing was sent but results are unknown. He remained on Eliquis 5 mg twice daily.  He reported feeling like he is having difficulty healing as well as increased stool frequency.  He also has had occasional episodes of abdominal pain over the past few days but currently denies any active abdominal pain.  He does still have his gallbladder but reported the pain is more midline. Outpatient labs on 10/12/21 were reviewed and discussed with the patient: iron 23 (low), TIBC 273, Tsat 8%, ferritin 21, vitamin B12 855, folate >15  HCM:  - COVID vaccination: s/p 4 shots, with no antibodies  - Colonoscopy: DNA stool test June 2020, negative   SH:  - Living situation: Lives in [place]  - Smoking/etoh/illicits: never smoker, rare etoh   FH:  - Father with pancreatic cancer age 86 [de-identified] : Cameron presents for follow up on 5/12/25 for DVT and erythrocytosis.  Doppler US on 4/6/22 which showed chronic bilateral lower extremity DVTs without central clot propagation.  Hypercoagulable workup negative.  At today's visit Cameron reports feeling generally well except for persistent fatigue.  He was seen recently at [facility] for ongoing bilateral LE lymphedema and wound care as well as skin tearing (felt to be a side effect of immunotherapy after renal transplant) - he returns to [place] annually each September for visit w/ the transplant team.  He continues eliquis 2.5mg BID. S/p 2 iron infusions in January 2024 with good tolerance.   He continues abtx for L LE cellulitis.  He previously saw Dr. Razo to establish care; chronic abdominal pain not exacerbated.  He is s/p right hip replacement on 10/6/23- has mostly recovered from the surgery.  Last phlebotomy 7/18/24 (as per renal transplant team at [facility]) - his CBC results remain within parameters since that time.   He denies recent fever/chills, chest pain, shortness of breath, nausea, vomiting, diarrhea. Using a loop recorder to monitor his heart rhythm.   RUQ US 12/27/23- Multiple small gallbladder stones without ultrasound evidence for acute cholecystitis.  Laboratory studies reviewed from 4/16/25 lab draw and notable for:  WBC 7.77, hgb 14.0, hct 445.9, plt 398 Creat 1.35, AST/ALT wnl Pt prefers to have labs draw 3 months at an outside lab near his home in [place]

## 2025-05-12 NOTE — HISTORY OF PRESENT ILLNESS
[de-identified] : Referred by: Hospital Follow up   Mr. Reeder presented at age 66 in November 2021 for evaluation of bilateral DVT, here for hospital follow up.  The patient has a medical history of DM, s/p gastric sleeve, renal transplant July 2020 ([facility]), prior CABG 7/2021 on ASA, c/b DVT/leg clot, known heparin allergy.   Presenting HPI: The patient was recently admitted to [facility] from 11/5/21 to 11/10/21 for LLE cellulitis and initiated on antibiotics for cellulitis, which was c/b bilateral DVT. Doppler US on 11/6/21 showed a non-occlusive DVT in the R common femoral, femoral, popliteal and gastrocnemius veins, as well as DVT involving the left popliteal vein.  X-ray of the left tib/fib showed no evidence of osteomyelitis. He was initiated on Eliquis 10 mg twice daily for 7 days followed by 5 mg twice daily for 3 months minimum.  Laboratory evaluation revealed leukocytosis to 18.41 (89% neutrophils), 6 CRP 12 (elevated), blood cultures no growth to date, urine culture negative.  Leukocytosis improved to 7.8 at the time of admission.    Since discharge he reports low energy levels.  He denies any active bleeding.  He was previously being followed by Dr. Long at [place] from hematology and had been previously treated with IV iron.  He is also pending phlebotomy today at 1:30 PM for elevated hemoglobin.  JAK2 testing was sent but results are unknown. He remained on Eliquis 5 mg twice daily.  He reported feeling like he is having difficulty healing as well as increased stool frequency.  He also has had occasional episodes of abdominal pain over the past few days but currently denies any active abdominal pain.  He does still have his gallbladder but reported the pain is more midline. Outpatient labs on 10/12/21 were reviewed and discussed with the patient: iron 23 (low), TIBC 273, Tsat 8%, ferritin 21, vitamin B12 855, folate >15  HCM:  - COVID vaccination: s/p 4 shots, with no antibodies  - Colonoscopy: DNA stool test June 2020, negative   SH:  - Living situation: Lives in [place]  - Smoking/etoh/illicits: never smoker, rare etoh   FH:  - Father with pancreatic cancer age 86 [de-identified] : Cameron presents for follow up on 5/12/25 for DVT and erythrocytosis.  Doppler US on 4/6/22 which showed chronic bilateral lower extremity DVTs without central clot propagation.  Hypercoagulable workup negative.  At today's visit Cameron reports feeling generally well except for persistent fatigue.  He was seen recently at [facility] for ongoing bilateral LE lymphedema and wound care as well as skin tearing (felt to be a side effect of immunotherapy after renal transplant) - he returns to [place] annually each September for visit w/ the transplant team.  He continues eliquis 2.5mg BID. S/p 2 iron infusions in January 2024 with good tolerance.   He continues abtx for L LE cellulitis.  He previously saw Dr. Razo to establish care; chronic abdominal pain not exacerbated.  He is s/p right hip replacement on 10/6/23- has mostly recovered from the surgery.  Last phlebotomy 7/18/24 (as per renal transplant team at [facility]) - his CBC results remain within parameters since that time.   He denies recent fever/chills, chest pain, shortness of breath, nausea, vomiting, diarrhea. Using a loop recorder to monitor his heart rhythm.   RUQ US 12/27/23- Multiple small gallbladder stones without ultrasound evidence for acute cholecystitis.  Laboratory studies reviewed from 4/16/25 lab draw and notable for:  WBC 7.77, hgb 14.0, hct 445.9, plt 398 Creat 1.35, AST/ALT wnl Pt prefers to have labs draw 3 months at an outside lab near his home in [place]

## 2025-05-12 NOTE — PHYSICAL EXAM
[Normal] : affect appropriate [de-identified] : Generally well appearing male, NAD [de-identified] : No significant LE swelling appreciable (wearing L LE Shavonne boot) [de-identified] :  Expected bruising of upper arms noted Isotretinoin Pregnancy And Lactation Text: This medication is Pregnancy Category X and is considered extremely dangerous during pregnancy. It is unknown if it is excreted in breast milk.

## 2025-05-16 NOTE — REVIEW OF SYSTEMS
[Dyspnea on exertion] : dyspnea during exertion [Fever] : no fever [Feeling Fatigued] : not feeling fatigued [SOB] : no shortness of breath [Chest Discomfort] : no chest discomfort [Palpitations] : no palpitations [Cough] : no cough [Abdominal Pain] : no abdominal pain [Dizziness] : no dizziness [Under Stress] : not under stress [Easy Bleeding] : no tendency for easy bleeding

## 2025-05-16 NOTE — PHYSICAL EXAM
[No Acute Distress] : no acute distress [Normal Venous Pressure] : normal venous pressure [Normal S1, S2] : normal S1, S2 [Clear Lung Fields] : clear lung fields [Non Tender] : non-tender [No Edema] : no edema [No Focal Deficits] : no focal deficits [Alert and Oriented] : alert and oriented [de-identified] : leg ulcer - wrapped left leg

## 2025-05-16 NOTE — HISTORY OF PRESENT ILLNESS
[FreeTextEntry1] : Cameron is a 70-year-old man who is seen in follow-up evaluation regarding his implanted loop monitor as well as his implanted leadless pacemaker Micra AV  The patient just returned from the medical visit to the [facility].  He had some infection and underwent a biopsy and the results are in discussion pending.  Interrogation of his implantable loop shows that he has PVCs less than 2%.  No significant bradycardia or pauses.  No A-fib noted.  Patient his Micra AV pacemaker shows remaining battery longevity greater than 10 years.  The right ventricular lead impedance was 509 ohms within normal range.  Capture threshold 0.5 V at 0.24 ms.  He is ventricular paced only about 21% of the time.  Is programmed VDD rate of 50 at 125. Interrogation of his implantable loop monitor showed that he had episodes of bradycardia with isolated episodes OF AV block 4 seconds and heart rates 45 to 50 bpm.  Episodes seem to occur in the night possible sleeping hours.  Patient has a prior history of coronary artery bypass surgery 2020 triple-vessel with a LIMA to the LAD.  He had postoperative atrial fibrillation and CKD.  The patient had a prior renal transplant in 2020 and is maintained on immunosuppression Prograf CellCept and prednisone.  He has a prior history of type 2 diabetes, hypertension, sleeve gastrectomy and previous BENIGNO.  He is currently not using CPAP because he has had weight loss.  The patient has had issues with easy bruising and wounds including ulcer that he sustained to the left lower extremity that is currently wrapped and he is getting wound care.  He was previously evaluated by his cardiologist and he was noted to have an ectopic atrial rhythm with sinus bradycardia and first-degree AV block and blocked APCs.  The patient has hip right hip pain that severely limits him and requires surgical procedure of hip replacement.  He had an event monitor placed prior to the proposed surgery.  Zio patch monitor performed for 3 days starting 8/17/2023: Showed secondary type I AV block with up to 3-second pause these were all nocturnal.  He also had ventricular couplet, single PVC and 3 beats of NSVT.  On 8/20/2023 at 7:01 AM the patient had High-grade AV block.  He also had similar third-degree AV block on 8/19/2023 at 6:10 AM.   The patient had prior bariatric surgery and is lost weight.  He had a previous sleep study done in 2024 that showed an AHI of 8.5.

## 2025-05-16 NOTE — DISCUSSION/SUMMARY
[FreeTextEntry1] : Overall the impression based on the interrogation of the loop monitor as well as the pacemaker is his pacemaker is functioning normally and pacing appropriately.   No cardiac symptoms Has some fatigue He has been on Eliquis 2.5 mg for previous blood clot in the leg. The patient did have a sleep study done May 2024 [facility] that showed an AHI of 8.5. Continue with remote monitoring.

## 2025-06-18 NOTE — DATA REVIEWED
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 Clarithromycin is incubating for 14 days to test for inducible resistance (erm gene). Other in vitro susceptibilities are available in Epic.   The current results are as follows:  Antibiotic | Concentration | Susceptibility ----------|--------------|------------ Amikacin | 32 mcg/mL | Intermediate Cefoxitin | >128 mcg/mL | Resistant Ciprofloxacin | >4 mcg/mL | Resistant Clofazimine | 0.5 mcg/mL |  Doxycycline | 0.5 mcg/mL | Susceptible Imipenem | 8 mcg/mL | Intermediate Linezolid | 16 mcg/mL | Intermediate Moxifloxacin | >4 mcg/mL | Resistant Tigecycline | 0.25 mcg/mL |  Tobramycin | 4 mcg/mL | Intermediate Trimethoprim + Sulfamethoxazole | 4/76 mcg/mL | Resistant

## 2025-06-18 NOTE — PHYSICAL EXAM
[General Appearance - Alert] : alert [General Appearance - In No Acute Distress] : in no acute distress [Sclera] : the sclera and conjunctiva were normal [PERRL With Normal Accommodation] : pupils were equal in size, round, reactive to light [Extraocular Movements] : extraocular movements were intact [Outer Ear] : the ears and nose were normal in appearance [Oropharynx] : the oropharynx was normal with no thrush [Neck Appearance] : the appearance of the neck was normal [Neck Cervical Mass (___cm)] : no neck mass was observed [Jugular Venous Distention Increased] : there was no jugular-venous distention [] : no respiratory distress [Auscultation Breath Sounds / Voice Sounds] : lungs were clear to auscultation bilaterally [Heart Rate And Rhythm] : heart rate was normal and rhythm regular [Heart Sounds] : normal S1 and S2 [Full Pulse] : the pedal pulses are present [Edema] : there was no peripheral edema [Bowel Sounds] : normal bowel sounds [Abdomen Soft] : soft [Abdomen Tenderness] : non-tender [Costovertebral Angle Tenderness] : no CVA tenderness [Musculoskeletal - Swelling] : no joint swelling [Nail Clubbing] : no clubbing  or cyanosis of the fingernails [Motor Tone] : muscle strength and tone were normal [Skin Color & Pigmentation] : normal skin color and pigmentation [FreeTextEntry1] : There are total of 6 lesions on his left lower leg, pustular.  There are 2 lesions near the mid leg anteriorly/anterior medially.  There is a superior lesion on the left medial calf area.  There are 3 lesions on the dorsum of his left foot.  All appear to be pustular, with no significant drainage, erythematous base.  The first lesion to develop was the one on the mid anterior medial leg.  Then by February 2025, another lesion develop on the lateral edge of his foot. [Cranial Nerves] : cranial nerves 2-12 were intact [Sensation] : the sensory exam was normal to light touch and pinprick [No Focal Deficits] : no focal deficits [Oriented To Time, Place, And Person] : oriented to person, place, and time [Affect] : the affect was normal

## 2025-06-18 NOTE — ASSESSMENT
[FreeTextEntry1] : This 70 y.o. man with ESRD, renal transplant, on chronic immunosuppression.  here for cutaneous mycobacterial infection.   - will start trial of Doxycycline 100mg PO BID and Linezolid 600mg PO BID.   - unclear if this is the best regimen,  will need to monitor closely his mycophenolate levels and tacrolimus levels  - I have relayed a message to his Renal Doctor here, Dr. Villalta.   I will refer him over to my colleague in the NTM center at [facility] Dr. Michael Oppenheim, in case IV therapy is needed.    follow up in 2 weeks or so.

## 2025-06-18 NOTE — ASSESSMENT
[FreeTextEntry1] : This 70 y.o. man with ESRD, renal transplant, on chronic immunosuppression.  here for cutaneous mycobacterial infection.   - will start trial of Doxycycline 100mg PO BID and Linezolid 600mg PO BID.   - unclear if this is the best regimen,  will need to monitor closely his mycophenolate levels and tacrolimus levels  - I have relayed a message to his Renal Doctor here, Dr. Villalta.   I will refer him over to my colleague in the NTM center at [facility], Dr. Michael Oppenheim, in case IV therapy is needed.    follow up in 2 weeks or so.

## 2025-06-20 NOTE — HISTORY OF PRESENT ILLNESS
[FreeTextEntry1] : 70-year-old man with history of type 2 diabetes, renal failure status post kidney transplant in 2020 (on prednisone, cellcept and tacrolimus), CAD s/p CABG, A-Fib and AV block (on Eliquis and pacemaker), obesity status post sleeve gastrectomy 2015, hyperlipidemia and recent right hip replacement presents for follow-up.   Pt had LLE wound- recently recovered. After [place] travel, he developed mycobacterium lesions on Lt leg.   Labs  April 2025- HA1C 8.4% Sep 2024- A1C 6.7%, eGFR 50, Vit B12 of 1299, Tg 122, LDL 77 May 2024- A1C 7.2%, eGFR 55, ACR 9.1 Jan 2024- eGFR 52 (previously ordered labs were not completed)  CGM downloaded and reviewed: Oscar 2 - doesn't scan oscar frequently; can't upgrade to Oscar 3 for another year due to insurance issues.  Average glucose: 168 % time CGM active:74% % VERY HIGH (>250): 9% % HIGH (181-250): 26% % TARGET (): 65% % LOW (54-69): 0 % VERY LOW (<54): 0  Currently takes Tresiba 10 U in the morning and Humalog 3-10 U before breakfast (forgets to take it before lunch or dinner), metformin 500 mg BID. Stopped Trulicity 0.75 mg once a week due to cost. No SE on Trulicity. Doesn't want to take Januvia due to concern for potential side effects.    States he has difficulty carrying around and remembering to take Humalog but he couldn't afford Cequr.    Pt is not scanning oscar frequently. Available data shows a pattern of morning hypoglycemia in 60's and post prandial hyperglycemia. Morning hypoglycemia are asymptomatic. Doesn't confirm it with glucometer. Denies sleeping on the arm the sensor is attached.

## 2025-06-20 NOTE — ASSESSMENT
[FreeTextEntry1] : 1. Sub optimally Type 2 diabetes-poorly controlled 2. Steroid-induced hyperglycemia (on chronic prednisone 5 mg daily) - c/w Tresiba 10 units daily. CGM report mainly shows postprandial hyperglycemia. Reminded him about administering prandial insulin 15 min before all meals. Usually administers 4-9 U Humalog if he administers it. Advised Humalog 4-6 U plus 2 U for every 50 mg/dl glucose above 150 mg/dl. c/w metformin 500 mg BID.   Stopped Trulicity 0.75 mg once a week due price.  - Emphasized the importance of following recommended regimen. Usually forgets pre-meal insulins.  - We discussed using pen carrying cooler bag to carry his insulin around and improve his compliance.     3. Hyperlipidemia - Continue with rosuvastatin milligrams daily  4. Vit D deficiency- takes vit D supplementation- 2000 IU daily. will check vit D level.

## 2025-07-02 NOTE — HISTORY OF PRESENT ILLNESS
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  History abstracted from [facility] Records: He underwent a living unrelated kidney transplant on September 17, 2020, for management of ESRD due to complications from type 2 diabetes mellitus. He is on chronic immunosuppression with Mycophenolate mofetil, tacrolimus and prednisone.  Mr. Reeder developed left leg swelling and pain with skin ulcerations sometime in April 2025. He was following a Vascular Surgeon in [place] who prescribed local wound care and Silvadene with compression wrapping; however, his symptoms and the ulcers did not improve, so he came here to [facility] on May 06, 2025, and was seen by Dermatology, who noted several scattered sores including subcutaneous nodules on his left lower leg. An excisional biopsy was performed on one of the lesions, and dermatopathology revealed AFB and Aravind stains positive with multifocal aggregates of bacilli within areas of abscess, compatible with mycobacterial infection. 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 In-vitro susceptibilities show the isolate is resistant to Cefoxitin, Ciprofloxacin, Moxifloxacin and Trimethoprim-sulfamethoxazole. It is intermediate to Tobramycin, Linezolid, Imipenem and Amikacin. We are waiting for Clarithromycin to result out pending 14-day incubation for inducible resistance (erm gene).  update: Clarithromycin-intermediate susceptibility 4mg/ml.  **The rest of the records are scanned under outside medical record, from June 4, 2025.    He had previously seen a dermatologist in [facility] that was concerned about skin cancers but did not offer any significant treatment.  He then referred himself to the [facility] skin clinic for the scattered sores on his leg on 5/6/2025.  Biopsies at that time showed a positive Aravind and AFB stains with multifocal aggregates of bacilli within the areas of abscess compatible mycobacterial infection.  The dermatology team had contacted the transplant team who did a E consult. He had return to [place] before he could be seen by transplant infectious disease at [facility]. Transplant ID also perform an E consultation, with a proposed plan of antibiotics.  He self-referred to our office. Labs reviewed, baseline creatinine is 1.35. 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  He does occasionally immerse his legs in water. He said around January 2025, the first lesion showed up on his left medial leg.  Over the course of time, more lesions showed up on his left foot. He has tried various things including some topical ointments without any improvement.  He also developed a recent new lesion on the dorsum of the right foot around the fourth metatarsal area.  blood culture 5/30/2025 was negative.   He was in [place] recently and he also immerse his feet in the cold water of the ocean because it sooth the pain. =====  Since the last visit, he had been taking the medications and noted 2 episodes of emesis.  He is not eating too much.  He realized that he has been having a lot of hypoglycemic episodes and he has to load up on carbohydrates to try to keep him from going to more hypoglycemia.  He has even been reducing dosages of his insulin. Medications were reviewed.  He has no other new medications.

## 2025-07-02 NOTE — HISTORY OF PRESENT ILLNESS
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  History abstracted from [facility] Records: He underwent a living unrelated kidney transplant on September 17, 2020, for management of ESRD due to complications from type 2 diabetes mellitus. He is on chronic immunosuppression with Mycophenolate mofetil, tacrolimus and prednisone.  Mr. Reeder developed left leg swelling and pain with skin ulcerations sometime in April 2025. He was following a Vascular Surgeon in [place] who prescribed local wound care and Silvadene with compression wrapping; however, his symptoms and the ulcers did not improve, so he came here to [facility] on May 06, 2025, and was seen by Dermatology, who noted several scattered sores including subcutaneous nodules on his left lower leg. An excisional biopsy was performed on one of the lesions, and dermatopathology revealed AFB and Aravind stains positive with multifocal aggregates of bacilli within areas of abscess, compatible with mycobacterial infection. 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 In-vitro susceptibilities show the isolate is resistant to Cefoxitin, Ciprofloxacin, Moxifloxacin and Trimethoprim-sulfamethoxazole. It is intermediate to Tobramycin, Linezolid, Imipenem and Amikacin. We are waiting for Clarithromycin to result out pending 14-day incubation for inducible resistance (erm gene).  update: Clarithromycin-intermediate susceptibility 4mg/ml.  **The rest of the records are scanned under outside medical record, from June 4, 2025.    He had previously seen a dermatologist in [facility] that was concerned about skin cancers but did not offer any significant treatment.  He then referred himself to the [facility] skin clinic for the scattered sores on his leg on 5/6/2025.  Biopsies at that time showed a positive Aravind and AFB stains with multifocal aggregates of bacilli within the areas of abscess compatible mycobacterial infection.  The dermatology team had contacted the transplant team who did a E consult. He had return to [place] before he could be seen by transplant infectious disease at [facility]. Transplant ID also perform an E consultation, with a proposed plan of antibiotics.  He self-referred to our office. Labs reviewed, baseline creatinine is 1.35. 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  He does occasionally immerse his legs in water. He said around January 2025, the first lesion showed up on his left medial leg.  Over the course of time, more lesions showed up on his left foot. He has tried various things including some topical ointments without any improvement.  He also developed a recent new lesion on the dorsum of the right foot around the fourth metatarsal area.  blood culture 5/30/2025 was negative.   He was in [place] recently and he also immerse his feet in the cold water of the ocean because it sooth the pain. =====  Since the last visit, he had been taking the medications and noted 2 episodes of emesis.  He is not eating too much.  He realized that he has been having a lot of hypoglycemic episodes and he has to load up on carbohydrates to try to keep him from going to more hypoglycemia.  He has even been reducing dosages of his insulin. Medications were reviewed.  He has no other new medications.

## 2025-07-02 NOTE — ASSESSMENT
[FreeTextEntry1] : This 70 y.o. man with ESRD, renal transplant, on chronic immunosuppression.  here for follow-up of cutaneous mycobacterial infection.   On interval follow-up, lesions are much smaller than before.  He has been on doxycycline and linezolid.  However he has developed some new events of hypoglycemia, concerned that the linezolid is the culprit drug.  At this time we will stop the linezolid. I will refer him over to my colleague in the NTM center at [facility], Dr. Michael Oppenheim, in case IV therapy is needed.   WILL add clarithromycin 500mg  po BID Continue the doxycycline 100 mg twice a day.  We spoke again about sun precautions.  He understands.

## 2025-07-02 NOTE — ASSESSMENT
[FreeTextEntry1] : This 70 y.o. man with ESRD, renal transplant, on chronic immunosuppression.  here for follow-up of cutaneous mycobacterial infection.   On interval follow-up, lesions are much smaller than before.  He has been on doxycycline and linezolid.  However he has developed some new events of hypoglycemia, concerned that the linezolid is the culprit drug.  At this time we will stop the linezolid. I will refer him over to my colleague in the NTM center at [facility], Dr. Michael Oppenheim, in case IV therapy is needed.   WILL add clarithromycin 500mg  po BID Continue the doxycycline 100 mg twice a day.  We spoke again about sun precautions.  He understands.

## 2025-07-02 NOTE — PHYSICAL EXAM
[General Appearance - Alert] : alert [General Appearance - In No Acute Distress] : in no acute distress [Sclera] : the sclera and conjunctiva were normal [PERRL With Normal Accommodation] : pupils were equal in size, round, reactive to light [Extraocular Movements] : extraocular movements were intact [Outer Ear] : the ears and nose were normal in appearance [Oropharynx] : the oropharynx was normal with no thrush [Neck Appearance] : the appearance of the neck was normal [Neck Cervical Mass (___cm)] : no neck mass was observed [Jugular Venous Distention Increased] : there was no jugular-venous distention [] : no respiratory distress [Auscultation Breath Sounds / Voice Sounds] : lungs were clear to auscultation bilaterally [Heart Rate And Rhythm] : heart rate was normal and rhythm regular [Heart Sounds] : normal S1 and S2 [Full Pulse] : the pedal pulses are present [Edema] : there was no peripheral edema [Bowel Sounds] : normal bowel sounds [Abdomen Soft] : soft [Abdomen Tenderness] : non-tender [Costovertebral Angle Tenderness] : no CVA tenderness [Musculoskeletal - Swelling] : no joint swelling [Nail Clubbing] : no clubbing  or cyanosis of the fingernails [Motor Tone] : muscle strength and tone were normal [Skin Color & Pigmentation] : normal skin color and pigmentation [Cranial Nerves] : cranial nerves 2-12 were intact [Sensation] : the sensory exam was normal to light touch and pinprick [No Focal Deficits] : no focal deficits [Oriented To Time, Place, And Person] : oriented to person, place, and time [Affect] : the affect was normal [FreeTextEntry1] : (prior exam) There are total of 6 lesions on his left lower leg, pustular.  There are 2 lesions near the mid leg anteriorly/anterior medially.  There is a superior lesion on the left medial calf area.  There are 3 lesions on the dorsum of his left foot.  All appear to be pustular, with no significant drainage, erythematous base.  The first lesion to develop was the one on the mid anterior medial leg.  another lesion develop on the lateral edge of his foot.   (current exam) Overall the lesions are much smaller.  The lateral edge of the foot lesion is no longer open it is scarred up.  There are 2 lesions on the midportion of the leg one of them have scarred up as well.

## 2025-07-28 NOTE — RESULTS/DATA
[FreeTextEntry1] : Mr. Reeder initially presented at age 66 in November 2021 for evaluation of bilateral DVT, here for hospital follow up.  The patient has a medical history of DM, s/p gastric sleeve, renal transplant July 2020 ([facility]), prior CABG on ASA, c/b DVT/leg clot, known heparin allergy.   Continue eliquis 2.5mg BID for b/l DVT. Doppler US with chronic thrombosis, no central clot propagation.  Repeat US 4/2023- no new DVT, +RLE seroma  No indication for phlebotomy at this time.  Hypercoagulable workup negative.  Fatigue- workup negative, stable overall No evidence of anemia recently   This is likely multifactorial 2/2 poor sleep, myelosuppressive medications.  Saw w/ Dr. Razo for intermittent longstanding epigastric discomfort RUQ US revealed gallstones, no evidence of cholecystitis.  Now proceeding w/ phlebotomy (500 ml) q 4 weeks prn as per [facility] renal transplant team for hgb >17 and/or hct >50. - last phlebotomy performed 7/18/24 (now on labs q3 months) Next routine visit w/ Dr. Bran in 12/2025 (will be seen at [facility] in 9/2025)  All patient questions answered at today's visit. Patient urged to call the office with any questions or concerns.   I personally have spent a total of 35 minutes of time on the date of this encounter reviewing test results, documenting findings, coordinating care and directly consulting with the patient and/or designated family member. Greater than 50% of the face to face encounter time was spent on counseling and/or coordination of care for recurrent DVT, fatigue, abdominal pain.

## 2025-07-28 NOTE — HISTORY OF PRESENT ILLNESS
[de-identified] : Referred by: Hospital Follow up   Mr. Reeder presented at age 66 in November 2021 for evaluation of bilateral DVT, here for hospital follow up.  The patient has a medical history of DM, s/p gastric sleeve, renal transplant July 2020 ([facility]), prior CABG 7/2021 on ASA, c/b DVT/leg clot, known heparin allergy.   Presenting HPI: The patient was recently admitted to [facility] from 11/5/21 to 11/10/21 for LLE cellulitis and initiated on antibiotics for cellulitis, which was c/b bilateral DVT. Doppler US on 11/6/21 showed a non-occlusive DVT in the R common femoral, femoral, popliteal and gastrocnemius veins, as well as DVT involving the left popliteal vein.  X-ray of the left tib/fib showed no evidence of osteomyelitis. He was initiated on Eliquis 10 mg twice daily for 7 days followed by 5 mg twice daily for 3 months minimum.  Laboratory evaluation revealed leukocytosis to 18.41 (89% neutrophils), 6 CRP 12 (elevated), blood cultures no growth to date, urine culture negative.  Leukocytosis improved to 7.8 at the time of admission.    Since discharge he has reported low energy levels.  He denied any active bleeding.  He was previously being followed by Dr. Long at [place] from hematology and had been previously treated with IV iron.  He was also pending phlebotomy for elevated hemoglobin.  JAK2 testing was sent but results are unknown. He remained on Eliquis 5 mg twice daily.  He reported feeling like he is having difficulty healing as well as increased stool frequency.  He also has had occasional episodes of abdominal pain over the past few days but currently denies any active abdominal pain.  He does still have his gallbladder but reported the pain is more midline. Outpatient labs on 10/12/21 were reviewed and discussed with the patient: iron 23 (low), TIBC 273, Tsat 8%, ferritin 21, vitamin B12 855, folate >15  HCM:  - COVID vaccination: s/p 4 shots, with no antibodies  - Colonoscopy: DNA stool test June 2020, negative   SH:  - Living situation: Lives in [place]  - Smoking/etoh/illicits: never smoker, rare etoh   FH:  - Father with pancreatic cancer age 86 [de-identified] : Cameron presents for follow up on 7/28/25 for DVT and erythrocytosis.  Doppler US on 4/6/22 which showed chronic bilateral lower extremity DVTs without central clot propagation.  Hypercoagulable workup negative.  At today's visit Cameron is being followed by Dr. Oppenheim (ID at [facility]) and [facility] for ongoing bilateral LE lymphedema and wound care - skin biopsy at [facility] showed cutaneous mycobacterium chelonase of the L LE.  Likely contracted from water exposure in [place].  Was unable to tolerate initial abtx regimen (will be starting a new combination next week after labs + EKG). He follows annually w/ the renal transplant team at [facility] each September.  He continues eliquis 2.5mg BID. S/p 2 iron infusions in January 2024 with good tolerance.     He previously saw Dr. Razo to establish care; chronic abdominal pain not exacerbated.  He is s/p right hip replacement on 10/6/23- has mostly recovered from the surgery.  Last phlebotomy 7/18/24 (as per renal transplant team at [facility]) - his CBC results remain within parameters since that time.   He denies recent fever/chills, chest pain, shortness of breath, nausea, vomiting, diarrhea.  Chronic fatigue persists. Using a loop recorder to monitor his heart rhythm.   RUQ US 12/27/23- Multiple small gallbladder stones without ultrasound evidence for acute cholecystitis.  Laboratory studies reviewed from 7/16/25 lab draw and notable for:  WBC 9.8, hgb 12.4, hct 40.9, plt 502 Creat 1.79,  iron 33 (L),  (L), % sat 12 (L), ferritin 124, B12 1416, folate 13.5 Pt prefers to have labs draw 3 months at an outside lab near his home in [place]

## 2025-07-28 NOTE — PHYSICAL EXAM
[Normal] : affect appropriate [de-identified] : Generally well appearing male, NAD [de-identified] : No significant LE swelling appreciable (wearing L LE Shavonne boot) [de-identified] :  Expected bruising of upper arms noted